# Patient Record
Sex: FEMALE | Race: WHITE | NOT HISPANIC OR LATINO | Employment: OTHER | URBAN - METROPOLITAN AREA
[De-identification: names, ages, dates, MRNs, and addresses within clinical notes are randomized per-mention and may not be internally consistent; named-entity substitution may affect disease eponyms.]

---

## 2019-10-17 ENCOUNTER — CONSULT (OUTPATIENT)
Dept: CARDIOLOGY CLINIC | Facility: CLINIC | Age: 79
End: 2019-10-17
Payer: COMMERCIAL

## 2019-10-17 VITALS
HEART RATE: 52 BPM | HEIGHT: 67 IN | WEIGHT: 142 LBS | SYSTOLIC BLOOD PRESSURE: 140 MMHG | DIASTOLIC BLOOD PRESSURE: 82 MMHG | BODY MASS INDEX: 22.29 KG/M2

## 2019-10-17 DIAGNOSIS — I35.0 NONRHEUMATIC AORTIC VALVE STENOSIS: ICD-10-CM

## 2019-10-17 DIAGNOSIS — I47.1 SVT (SUPRAVENTRICULAR TACHYCARDIA) (HCC): ICD-10-CM

## 2019-10-17 DIAGNOSIS — I10 ESSENTIAL HYPERTENSION: ICD-10-CM

## 2019-10-17 DIAGNOSIS — R00.2 PALPITATIONS: Primary | ICD-10-CM

## 2019-10-17 DIAGNOSIS — E78.00 HYPERCHOLESTEREMIA: ICD-10-CM

## 2019-10-17 DIAGNOSIS — I35.1 NONRHEUMATIC AORTIC VALVE INSUFFICIENCY: ICD-10-CM

## 2019-10-17 PROCEDURE — 93000 ELECTROCARDIOGRAM COMPLETE: CPT | Performed by: INTERNAL MEDICINE

## 2019-10-17 PROCEDURE — 99204 OFFICE O/P NEW MOD 45 MIN: CPT | Performed by: INTERNAL MEDICINE

## 2019-10-17 RX ORDER — LISINOPRIL 10 MG/1
10 TABLET ORAL DAILY
COMMUNITY

## 2019-10-17 RX ORDER — DOXYCYCLINE 100 MG/1
CAPSULE ORAL
Refills: 0 | COMMUNITY
Start: 2019-09-12

## 2019-10-17 RX ORDER — FLUTICASONE PROPIONATE 50 MCG
1 SPRAY, SUSPENSION (ML) NASAL DAILY
COMMUNITY

## 2019-10-17 RX ORDER — MELATONIN
1000 DAILY
COMMUNITY

## 2019-10-17 RX ORDER — AMLODIPINE BESYLATE 2.5 MG/1
2.5 TABLET ORAL
Refills: 3 | COMMUNITY
Start: 2019-09-12

## 2019-10-17 RX ORDER — METOPROLOL SUCCINATE 100 MG/1
100 TABLET, EXTENDED RELEASE ORAL DAILY
Refills: 3 | COMMUNITY
Start: 2019-09-12

## 2019-10-17 RX ORDER — LEVOTHYROXINE SODIUM 0.07 MG/1
TABLET ORAL
Refills: 3 | COMMUNITY
Start: 2019-08-30

## 2019-10-17 RX ORDER — MULTIVITAMIN
1 TABLET ORAL DAILY
COMMUNITY

## 2019-10-17 RX ORDER — CHLORAL HYDRATE 500 MG
1000 CAPSULE ORAL DAILY
COMMUNITY

## 2019-10-17 NOTE — PROGRESS NOTES
Cardiology Consultation     Vish Park  [id number]  1940  [facility] [address] [facility] Wise  [address] [place] [id number] [phone]    1  Palpitations  POCT ECG   2  Essential hypertension     3  Nonrheumatic aortic valve stenosis  Echo complete with contrast if indicated   4  Nonrheumatic aortic valve insufficiency  Echo complete with contrast if indicated   5  Hypercholesteremia     6  SVT (supraventricular tachycardia) (HCC)         HPI:    Mrs Minerva Castellano comes in for consultation to discuss symptoms of palpitations and tachycardia that she has been experiencing on and off over the last year few years at least   She had a workup for this tachycardia about a year and half ago and wore a Holter monitor that was relatively unremarkable other than a 5 beat run of nonsustained atrial tachycardia  She did have an echocardiogram performed before this because of a murmur that did demonstrate aortic valve disease, with report of mild aortic stenosis and moderate aortic sufficiency  [place] continued to have these episodes of palpitations and tachycardia  Some of these episodes would last 20 minutes  She ended up getting an ECG alen for her phone and started following her rhythm/heart rate  At baseline she appears to be sinus bradycardia, since she is on metoprolol  However she did have episodes of tachycardia with heart rates near 150  I reviewed the strips from her phone, and there was a lot of baseline artifact  It did not appear to be atrial fibrillation as it either appeared to be sinus tachycardia or SVT  More likely SVT, given her heart rate and the fact that her symptoms were at rest   During these events she would feel palpitations and then feel quite fatigued afterwards like she had just [place] a race"  Her PCP increased her metoprolol from  mg daily, and she has not had events since    She denies any chest pain or shortness of breath  She is not limited with her activities  She denies lightheadedness or any history of syncope  No signs or symptoms of CHF  Patient Active Problem List   Diagnosis    Essential hypertension    Nonrheumatic aortic valve stenosis    Nonrheumatic aortic valve insufficiency    Hypercholesteremia    SVT (supraventricular tachycardia) ([facility])     History reviewed  No pertinent past medical history  Social History     Socioeconomic History    Marital status:      Spouse name: Not on file    Number of children: Not on file    Years of education: Not on file    Highest education level: Not on file   Occupational History    Not on file   Social Needs    Financial resource strain: Not on file    Food insecurity:     Worry: Not on file     Inability: Not on file    Transportation needs:     Medical: Not on file     Non-medical: Not on file   Tobacco Use    Smoking status: Not on file   Substance and Sexual Activity    Alcohol use: Not on file    Drug use: Not on file    Sexual activity: Not on file   Lifestyle    Physical activity:     Days per week: Not on file     Minutes per session: Not on file    Stress: Not on file   Relationships    Social connections:     Talks on phone: Not on file     Gets together: Not on file     Attends Muslim service: Not on file     Active member of club or organization: Not on file     Attends meetings of clubs or organizations: Not on file     Relationship status: Not on file    Intimate partner violence:     Fear of current or ex partner: Not on file     Emotionally abused: Not on file     Physically abused: Not on file     Forced sexual activity: Not on file   Other Topics Concern    Not on file   Social History Narrative    Not on file      History reviewed  No pertinent family history  History reviewed  No pertinent surgical history      Current Outpatient Medications:     ADVAIR DISKUS 250-50 MCG/DOSE inhaler, INHALE 1 DOSE BY MOUTH TWICE DAILY, Disp: , Rfl: 3    amLODIPine (NORVASC) 2 5 mg tablet, Take 2 5 mg by mouth daily at bedtime, Disp: , Rfl: 3    Calcium-Vitamin D-Vitamin K (CALCIUM + D + K PO), Take by mouth, Disp: , Rfl:     cholecalciferol (VITAMIN D3) 1,000 units tablet, Take 1,000 Units by mouth daily, Disp: , Rfl:     doxycycline monohydrate (MONODOX) 100 mg capsule, TAKE 1 CAPSULE BY MOUTH TWICE DAILY FOR 21 DAYS, Disp: , Rfl: 0    fluticasone (FLONASE) 50 mcg/act nasal spray, 1 spray into each nostril daily, Disp: , Rfl:     levothyroxine 75 mcg tablet, TAKE 1 TABLET BY MOUTH ONCE DAILY IN THE MORNING ON AN EMPTY STOMACH, Disp: , Rfl: 3    lisinopril (ZESTRIL) 10 mg tablet, Take 10 mg by mouth daily, Disp: , Rfl:     metoprolol succinate (TOPROL-XL) 100 mg 24 hr tablet, Take 100 mg by mouth daily, Disp: , Rfl: 3    Multiple Vitamin (MULTIVITAMIN) tablet, Take 1 tablet by mouth daily, Disp: , Rfl:     Omega-3 Fatty Acids (FISH OIL) 1,000 mg, Take 1,000 mg by mouth daily, Disp: , Rfl:     Probiotic Product (PROBIOTIC-10 PO), Take by mouth, Disp: , Rfl:   Allergies   Allergen Reactions    Cefuroxime Sodium-Dextrose [Cefuroxime]     Montelukast     Penicillins      Vitals:    10/17/19 0850   BP: 140/82   BP Location: Left arm   Patient Position: Sitting   Cuff Size: Standard   Pulse: (!) 52   Weight: 64 4 kg (142 lb)   Height: 5' 7" (1 702 m)       Labs: All pertinent labs were reviewed that were performed recently  Of note she does have hypercholesterolemia with an LDL of 148  All other labs were unremarkable  Imaging:  ECG today shows sinus bradycardia and is otherwise normal     Review of Systems:  Review of Systems   Constitutional: Positive for fatigue  HENT: Negative  Eyes: Negative  Respiratory: Negative  Cardiovascular: Positive for palpitations  Gastrointestinal: Negative  Musculoskeletal: Negative  Skin: Negative  Allergic/Immunologic: Negative  Neurological: Negative  Hematological: Negative  Psychiatric/Behavioral: The patient is nervous/anxious  All other systems reviewed and are negative  Vitals:    10/17/19 0850   BP: 140/82   Pulse: (!) 52     Physical Exam:  Physical Exam   Constitutional: She is oriented to person, place, and time  She appears well-developed and well-nourished  HENT:   Head: Normocephalic and atraumatic  Eyes: Pupils are equal, round, and reactive to light  EOM are normal  Right eye exhibits no discharge  Left eye exhibits no discharge  No scleral icterus  Neck: Normal range of motion  Neck supple  No JVD present  No thyromegaly present  Cardiovascular: Normal rate, regular rhythm, S1 normal, S2 normal, intact distal pulses and normal pulses  No extrasystoles are present  Exam reveals no gallop, no friction rub and no decreased pulses  Murmur heard  Systolic murmur is present with a grade of 2/6  Pulmonary/Chest: Effort normal and breath sounds normal  No respiratory distress  She has no wheezes  She has no rales  Abdominal: Soft  Bowel sounds are normal  She exhibits no distension  There is no tenderness  Musculoskeletal: Normal range of motion  She exhibits no edema, tenderness or deformity  Neurological: She is alert and oriented to person, place, and time  No cranial nerve deficit  Skin: Skin is warm and dry  No rash noted  Psychiatric: She has a normal mood and affect  Judgment and thought content normal    Nursing note and vitals reviewed  Discussion/Summary:    1  Palpitations/SVT - Based on [place] symptoms and her rhythm strips from her ECG alen on her phone, this appears to be SVT  It looks regular and not like atrial fibrillation, although there was a significant amount of baseline artifact with this application  We did discuss longer term monitoring like an event monitor, however she has not had an event in over a month since stopping her metoprolol    Therefore we are going to observe and if she gets more events we will then determine the type of monitor she would wear  We will have her back in follow-up in 6 months if she has no events  2   Aortic valve disease - About 2 years ago she had an echocardiogram that by report showed mild aortic stenosis and moderate aortic insufficiency  We are repeating an echocardiogram and we will call her with the results  3   Hypertension - Her blood pressure is high normal today  It runs normal on other occasions  I am not going to make any changes to her regimen and she should periodically check her blood pressure at home  4   Hypercholesterolemia - Her LDL recently was 148  Based on her risk profile, she would meet criteria for statin therapy for remains at this level  However she did not appear interested, and wanted to continue dietary modifications  She will continue have this followed by her PCP  Counseling / Coordination of Care  Total office time spent today 40 minutes  Greater than 50% of total time was spent with the patient and / or family counseling and / or coordination of care